# Patient Record
Sex: MALE | Race: WHITE
[De-identification: names, ages, dates, MRNs, and addresses within clinical notes are randomized per-mention and may not be internally consistent; named-entity substitution may affect disease eponyms.]

---
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
## 2024-08-02 ENCOUNTER — HOSPITAL ENCOUNTER (EMERGENCY)
Dept: HOSPITAL 33 - ED | Age: 19
Discharge: HOME | End: 2024-08-02
Payer: COMMERCIAL

## 2024-08-02 VITALS
DIASTOLIC BLOOD PRESSURE: 84 MMHG | RESPIRATION RATE: 18 BRPM | SYSTOLIC BLOOD PRESSURE: 137 MMHG | TEMPERATURE: 100.4 F | HEART RATE: 99 BPM

## 2024-08-02 VITALS — OXYGEN SATURATION: 99 %

## 2024-08-02 DIAGNOSIS — R51.9: ICD-10-CM

## 2024-08-02 DIAGNOSIS — R42: ICD-10-CM

## 2024-08-02 DIAGNOSIS — R06.02: ICD-10-CM

## 2024-08-02 DIAGNOSIS — R50.9: ICD-10-CM

## 2024-08-02 DIAGNOSIS — M79.10: ICD-10-CM

## 2024-08-02 DIAGNOSIS — R05.1: ICD-10-CM

## 2024-08-02 DIAGNOSIS — B34.9: Primary | ICD-10-CM

## 2024-08-02 LAB
FLUAV AG NPH QL IA: NEGATIVE
FLUBV AG NPH QL IA: NEGATIVE
RSV AG SPEC QL IA: NEGATIVE
SARS-COV-2 AG RESP QL IA.RAPID: NEGATIVE

## 2024-08-02 PROCEDURE — 99283 EMERGENCY DEPT VISIT LOW MDM: CPT

## 2024-08-02 PROCEDURE — 86308 HETEROPHILE ANTIBODY SCREEN: CPT

## 2024-08-02 PROCEDURE — 87040 BLOOD CULTURE FOR BACTERIA: CPT

## 2024-08-02 PROCEDURE — 0241U: CPT

## 2024-08-02 PROCEDURE — 71045 X-RAY EXAM CHEST 1 VIEW: CPT

## 2024-08-02 PROCEDURE — 94760 N-INVAS EAR/PLS OXIMETRY 1: CPT

## 2024-08-02 PROCEDURE — 36415 COLL VENOUS BLD VENIPUNCTURE: CPT

## 2024-08-02 PROCEDURE — 87651 STREP A DNA AMP PROBE: CPT

## 2024-08-02 RX ADMIN — IBUPROFEN STA MG: 600 TABLET, FILM COATED ORAL at 05:44

## 2024-08-02 RX ADMIN — ACETAMINOPHEN STA MG: 325 TABLET ORAL at 05:44

## 2024-08-02 NOTE — XRAY
Indication: Fever and cough.



Comparison: May 19, 2023



Portable chest again demonstrates normal heart, lungs, and bony thorax.

## 2024-08-02 NOTE — ERPHSYRPT
- History of Present Illness


Time Seen by Provider: 08/02/24 05:15


Source: patient, family


Exam Limitations: no limitations


Physician History: 





This is a 19-year-old male patient of Dr. Rosa who presents with shortness of 

breath and fever that occurred prior to arrival while at work.  Patient woke up 

this morning feeling fine and went to work feeling fine.  However, at work he 

was having a headache, body aches, dizzy and felt as though he had a fever and 

his arms were tingling.  He also began having a nonproductive cough.  Patient 

arrives to the emergency department with a room air oxygen saturation level 

100%.  He has no known exposure to individuals with viral illness or with 

similar symptoms that he is having.


Timing/Duration: today


Activities at Onset: none


Severity of Dyspnea-Max: mild


Severity of Dyspnea-Current: mild


Possible Cause: no prior episodes


Modifying Factors: Improves With: coughing


Associated Symptoms: cough, fever, chills, lightheadedness, sweating, No chest 

pain/discomfort


Allergies/Adverse Reactions: 








amoxicillin [From Augmentin] Allergy (Mild, Verified 08/02/24 05:18)


   


clavulanic acid [From Augmentin] Allergy (Mild, Verified 08/02/24 05:18)


   





Home Medications: 








No Reportable Medications [No Reported Medications]  08/02/24 [History]





Hx Tetanus, Diphtheria Vaccination/Date Given:  (unk)


Hx Influenza Vaccination/Date Given:  (unk)


Hx Pneumococcal Vaccination/Date Given: Yes





Travel Risk





- International Travel


Have you traveled outside of the country in past 3 weeks: No





- Emerging Infectious Disease


Are you exhibiting symptoms associated with any current EIDs: Yes


Symptoms: Cough: New Onset, Fever, Headaches/Body Aches/, Shortness of Breath





- Review of Systems


Constitutional: Fever, Chills, Weakness


Eyes: No Symptoms


Ears, Nose, & Throat: No Symptoms


Respiratory: Cough


Cardiac: No Symptoms


Abdominal/Gastrointestinal: No Symptoms


Genitourinary Symptoms: No Symptoms


Musculoskeletal: No Symptoms


Skin: No Symptoms


Neurological: No Symptoms


Psychological: No Symptoms


Endocrine: No Symptoms


Hematologic/Lymphatic: No Symptoms


Immunological/Allergic: No Symptoms


All Other Systems: Reviewed and Negative





- Past Medical History


Pertinent Past Medical History: No


Neurological History: Migraines


ENT History: No Pertinent History


Cardiac History: No Pertinent History


Respiratory History: Other


Endocrine Medical History: No Pertinent History


Musculoskeletal History: Other


GI Medical History: No Pertinent History


 History: No Pertinent History


Psycho-Social History: No Pertinent History


Male Reproductive Disorders: No Pertinent History


Other Medical History: COVID-19 (vaccinated), B wrist fractures





- Past Surgical History


Past Surgical History: No


Neuro Surgical History: No Pertinent History


Cardiac: No Pertinent History


Respiratory: No Pertinent History


Gastrointestinal: No Pertinent History


Genitourinary: No Pertinent History


Musculoskeletal: No Pertinent History


Male Surgical History: No Pertinent History





- Social History


Smoking Status: Never smoker


Exposure to second hand smoke: Yes


Drug Use: none


Patient Lives Alone: No





- Nursing Vital Signs


Nursing Vital Signs: 


                               Initial Vital Signs











Temperature  101.3 F   08/02/24 05:05


 


Pulse Rate  108 H  08/02/24 05:05


 


Respiratory Rate  18   08/02/24 05:05


 


Blood Pressure  165/103   08/02/24 05:05


 


O2 Sat by Pulse Oximetry  99   08/02/24 05:05








                                   Pain Scale











Pain Intensity                 6

















- Physical Exam


General Appearance: no apparent distress, alert


Eye Exam: PERRL/EOMI, eyes nml inspection


Ears, Nose, Throat Exam: hearing grossly normal, normal ENT inspection, normal 

pharynx


Neck Exam: normal inspection, non-tender, supple, full range of motion


Respiratory Exam: normal breath sounds, lungs clear, respiratory distress, 

airway intact, No chest tenderness


Cardiovascular/Chest Exam: regular rate/rhythm


Abdominal/Gastrointestinal Exam: soft, normal bowel sounds, No tenderness


Rectal Exam: not done


Extremity Exam: non-tender, normal range of motion, normal inspection


Neurologic Exam: alert, oriented x 3, cooperative, CNs II-XII nml as tested, 

normal mood/affect, nml cerebellar function, nml station & gait, sensation nml


Skin Exam: normal color, warm, dry


Lymphatic Exam: No adenopathy


**SpO2 Interpretation**: normal


SpO2: 99


O2 Delivery: Room Air





- Course


Nursing assessment & vital signs reviewed: Yes


EKG Interpreted by Me: RATE ([id number]), Sinus Tach, NORMAL AXIS, NORMAL INTERVALS, 

NORMAL QRS, Other (No acute ischemic changes.  QTc is 400)


Ordered Tests: 


                               Active Orders 24 hr











 Category Date Time Status


 


 Pulse Oximetry (ED) STAT Care  08/02/24 05:28 Active


 


 CHEST 1 VIEW (PORTABLE) Stat Exams  08/02/24 05:41 Taken


 


 BLOOD CULTURE Stat Lab  08/02/24 05:28 Received


 


 MONO SCREEN Stat Lab  08/02/24 06:11 Completed








Medication Summary














Discontinued Medications














Generic Name Dose Route Start Last Admin





  Trade Name Renard  PRN Reason Stop Dose Admin


 


Acetaminophen  650 mg  08/02/24 05:30  08/02/24 05:44





  Acetaminophen 325 Mg Tablet  PO  08/02/24 05:31  650 mg





  STAT STA   Administration


 


Acetaminophen  Confirm  08/02/24 05:43 





  Acetaminophen 325 Mg Tablet  Administered  08/02/24 05:44 





  Dose  





  650 mg  





  .ROUTE  





  .STK-MED ONE  


 


Sodium Chloride  1,000 mls @ 999 mls/hr  08/02/24 05:28  08/02/24 05:42





  Sodium Chloride 0.9% 1000 Ml  IV  08/02/24 06:28  Not Given





  .Q1H1M STA  


 


Ibuprofen  600 mg  08/02/24 05:30  08/02/24 05:44





  Ibuprofen 600 Mg Tablet  PO  08/02/24 05:31  600 mg





  STAT STA   Administration


 


Ibuprofen  Confirm  08/02/24 05:43 





  Ibuprofen 600 Mg Tablet  Administered  08/02/24 05:44 





  Dose  





  600 mg  





  .ROUTE  





  .[facility]  











Lab/Rad Data: 


                               Laboratory Results











  08/02/24 08/02/24 08/02/24 Range/Units





  06:11 05:15 05:15 


 


Monoscreen  NEGATIVE    (NEGATIVE)  


 


Influenza Type A Ag    NEGATIVE  (NEGATIVE)  


 


Influenza Type B Ag    NEGATIVE  (NEGATIVE)  


 


RSV (PCR)    NEGATIVE  (NEGATIVE)  


 


SARS-CoV-2 (PCR)    NEGATIVE  (NEGATIVE)  


 


Group A Strep Antibody   NOT DETECTED   (NEGATIVE)  














- Progress


Progress: improved, re-examined


Air Movement: good


Progress Note: 





08/02/24 06:17


My medical decision making and the assignment of low to moderate complexity of 

this patient's medical issue today is based on review of the patient's past 

medical history, review of the patient's medication list, review the patient 

drug allergy list, history present illness and physical findings on examination.

  The workup in this patient includes COVID test, monotest, influenza a and B 

test, RSV test, group A strep test.  Will also perform a chest x-ray.  The 

patient has a fever and we will provide the patient with both Tylenol and 

ibuprofen.





The preliminary report of the chest x-ray was interpreted by me.  Appears to me 

that there are mild bibasilar groundglass opacities.  No definite large 

infiltrate or other acute findings.


08/02/24 06:26


I interpreted the patient's laboratory data results.  Based on the laboratory 

data results the patient does not have any acute, emergent medical issue.


Blood Culture(s) Obtained: No


Antibiotics given: No


Counseled pt/family regarding: lab results, diagnosis, need for follow-up, rad 

results





Medical Desision Making





- Independent Historian


Additional History obtained from: Spouse





- Diagnostic Testing


Diagnostic test were ordered, analyzed, and reviewed by me: Yes


Radiological Interpretation: Interpreted by me





- Risk of complications


Low Risk: Low risk of morbidity from additional dx testing or treatment





- Departure


Departure Disposition: Home


Clinical Impression: 


 Fever, Viral syndrome





Condition: Stable


Critical Care Time: No


Referrals: 


KEVIN ROSA MD [Primary Care Provider] - Follow up/PCP as directed


Additional Instructions: 


Drink plenty of clear, cool liquids before advancing diet.  Alternate Tylenol 

and ibuprofen as discussed every 4 hours while awake to help control aches and 

pains and fever.  If you have a fever, drink cool liquids, wear minimum clothing

and use cooling fan.  May also take lukewarm bath or shower.


Forms:  Work/School Release Form

## 2024-12-22 ENCOUNTER — HOSPITAL ENCOUNTER (EMERGENCY)
Dept: HOSPITAL 33 - ED | Age: 19
Discharge: HOME | End: 2024-12-22
Payer: COMMERCIAL

## 2024-12-22 VITALS — TEMPERATURE: 97.8 F | RESPIRATION RATE: 16 BRPM | OXYGEN SATURATION: 96 %

## 2024-12-22 VITALS — SYSTOLIC BLOOD PRESSURE: 125 MMHG | DIASTOLIC BLOOD PRESSURE: 90 MMHG

## 2024-12-22 VITALS — HEART RATE: 104 BPM

## 2024-12-22 DIAGNOSIS — R19.7: ICD-10-CM

## 2024-12-22 DIAGNOSIS — R11.2: Primary | ICD-10-CM

## 2024-12-22 PROCEDURE — 99281 EMR DPT VST MAYX REQ PHY/QHP: CPT

## 2024-12-22 RX ADMIN — ONDANSETRON ONE MG: 4 TABLET, ORALLY DISINTEGRATING ORAL at 16:11

## 2024-12-22 NOTE — ERPHSYRPT
- History of Present Illness


Source: patient


Exam Limitations: no limitations


Patient Subjective Stated Complaint: pt here for n/v/d that started today at 

0200. he is able to keep some fluids down,


Triage Nursing Assessment: pt alert, walked in, face mask in place, resp easy, 

skin w/d/p. no edema noted


Physician History: 


Patient has nausea vomiting diarrhea.  Does not have any abdominal pain.  Is 

been going on since this morning.  He thinks he might be a little dehydrated.  

He still urinating.  He does not have any abdominal pain.  More than half the 

people have seen today if it is the same symptoms.  Eating and drinking makes 

his symptoms worse nothing makes it better.  He has no other complaints





Timing/Duration: today


Allergies/Adverse Reactions: 








amoxicillin [From Augmentin] Allergy (Mild, Verified 12/22/24 15:52)


   


clavulanic acid [From Augmentin] Allergy (Mild, Verified 12/22/24 15:52)


   





Hx Tetanus, Diphtheria Vaccination/Date Given: No


Hx Influenza Vaccination/Date Given: No


Hx Pneumococcal Vaccination/Date Given: No


Immunizations Up to Date: Yes





Travel Risk





- International Travel


Have you traveled outside of the country in past 3 weeks: No





- Emerging Infectious Disease


Are you exhibiting symptoms associated with any current EIDs: Yes


Symptoms: Diarrhea, Headaches/Body Aches/, Vomitting





- Review of Systems


Constitutional: No Symptoms


Eyes: No Symptoms


Ears, Nose, & Throat: No Symptoms


Respiratory: No Symptoms


Abdominal/Gastrointestinal: Nausea, Vomiting, Diarrhea


Genitourinary Symptoms: No Symptoms


Musculoskeletal: No Symptoms


All Other Systems: Reviewed and Negative





- Past Medical History


Pertinent Past Medical History: No


Neurological History: Migraines


ENT History: No Pertinent History


Cardiac History: No Pertinent History


Respiratory History: Other


Endocrine Medical History: No Pertinent History


Musculoskeletal History: Other


GI Medical History: No Pertinent History


 History: No Pertinent History


Psycho-Social History: No Pertinent History


Male Reproductive Disorders: No Pertinent History


Other Medical History: COVID-19 (vaccinated), B wrist fractures





- Past Surgical History


Past Surgical History: No


Neuro Surgical History: No Pertinent History


Cardiac: No Pertinent History


Respiratory: No Pertinent History


Gastrointestinal: No Pertinent History


Genitourinary: No Pertinent History


Musculoskeletal: No Pertinent History


Male Surgical History: No Pertinent History





- Social History


Smoking Status: Never smoker


Exposure to second hand smoke: Yes


Drug Use: none


Patient Lives Alone: No





- Social Determinants of Health


Will the patient participate in the screening: Declined to provide





- Nursing Vital Signs


Nursing Vital Signs: 


                               Initial Vital Signs











Temperature  97.8 F   12/22/24 15:54


 


Pulse Rate  111 H  12/22/24 15:54


 


Respiratory Rate  16   12/22/24 15:54


 


Blood Pressure  142/80   12/22/24 15:54


 


O2 Sat by Pulse Oximetry  96   12/22/24 15:54








                                   Pain Scale











Pain Intensity                 5

















- Physical Exam


General Appearance: no apparent distress


Eye Exam: PERRL/EOMI


Respiratory Exam: normal breath sounds, lungs clear, No chest tenderness


Cardiovascular Exam: regular rate/rhythm


Gastrointestinal/Abdomen Exam: soft, normal bowel sounds, No tenderness, No 

distention


Extremity Exam: normal inspection, normal range of motion


Neurologic Exam: alert, oriented x 3


Skin Exam: normal color, warm


SpO2: 96





- Course


Nursing assessment & vital signs reviewed: Yes


Ordered Tests: 


Medication Summary














Discontinued Medications














Generic Name Dose Route Start Last Admin





  Trade Name Freq  PRN Reason Stop Dose Admin


 


Ondansetron HCl  8 mg  12/22/24 16:06  12/22/24 16:11





  Zofran 4 Mg/Udtablet Orally Disintegrating  PO  12/22/24 16:07  8 mg





  STAT ONE   Administration


 


Ondansetron HCl  Confirm  12/22/24 16:10 





  Zofran 4 Mg/Udtablet Orally Disintegrating  Administered  12/22/24 16:11 





  Dose  





  8 mg  





  .ROUTE  





  .STK-MED ONE  














- Progress


Progress: improved


Progress Note: 


Patient was stable throughout stay.  We did Zofran and an oralChallenge.  He 

tolerated it well.  I am going to send him home with some Zofran.  On the 

differential was gastroenteritis, colitis     








Medical Desision Making





- Diagnostic Testing


Diagnostic test were ordered, analyzed, and reviewed by me: Yes





- Risk of complications


Minimal Risk: Minimal risk of morbidity





- Departure


Departure Disposition: Home


Clinical Impression: 


 Nausea vomiting and diarrhea





Condition: Stable


Critical Care Time: No


Referrals: 


KEVIN ROSA MD [Primary Care Provider] - Follow up/PCP as directed


Instructions:  Nausea and Vomiting, Adult ED


Prescriptions: 


Ondansetron ODT 4 MG*** [Zofran Odt 4 mg***] 4 mg PO Q6H PRN PRN #14 tablet


 PRN Reason: Nausea